# Patient Record
Sex: MALE | Race: WHITE | NOT HISPANIC OR LATINO | Employment: FULL TIME | URBAN - METROPOLITAN AREA
[De-identification: names, ages, dates, MRNs, and addresses within clinical notes are randomized per-mention and may not be internally consistent; named-entity substitution may affect disease eponyms.]

---

## 2023-04-07 ENCOUNTER — APPOINTMENT (OUTPATIENT)
Dept: CT IMAGING | Facility: CLINIC | Age: 43
End: 2023-04-07
Attending: FAMILY MEDICINE
Payer: COMMERCIAL

## 2023-04-07 ENCOUNTER — HOSPITAL ENCOUNTER (EMERGENCY)
Facility: CLINIC | Age: 43
Discharge: HOME | End: 2023-04-07
Attending: FAMILY MEDICINE | Admitting: FAMILY MEDICINE
Payer: COMMERCIAL

## 2023-04-07 VITALS
WEIGHT: 200 LBS | TEMPERATURE: 98.1 F | DIASTOLIC BLOOD PRESSURE: 82 MMHG | HEART RATE: 61 BPM | SYSTOLIC BLOOD PRESSURE: 127 MMHG | HEIGHT: 71 IN | OXYGEN SATURATION: 98 % | BODY MASS INDEX: 28 KG/M2

## 2023-04-07 DIAGNOSIS — J32.9 OTHER SINUSITIS, UNSPECIFIED CHRONICITY: ICD-10-CM

## 2023-04-07 DIAGNOSIS — R51.9 ACUTE NONINTRACTABLE HEADACHE, UNSPECIFIED HEADACHE TYPE: ICD-10-CM

## 2023-04-07 PROBLEM — F32.A ANXIETY AND DEPRESSION: Status: ACTIVE | Noted: 2023-04-07

## 2023-04-07 PROBLEM — E66.3 OVERWEIGHT: Status: ACTIVE | Noted: 2021-09-20

## 2023-04-07 PROBLEM — F90.2 ATTENTION DEFICIT HYPERACTIVITY DISORDER (ADHD), COMBINED TYPE: Status: ACTIVE | Noted: 2022-01-07

## 2023-04-07 PROBLEM — R41.840 CONCENTRATION DEFICIT: Status: ACTIVE | Noted: 2021-09-20

## 2023-04-07 PROBLEM — J33.9 NASAL POLYPOSIS: Status: ACTIVE | Noted: 2018-05-16

## 2023-04-07 PROBLEM — F41.9 ANXIETY AND DEPRESSION: Status: ACTIVE | Noted: 2023-04-07

## 2023-04-07 PROBLEM — S22.42XA MULTIPLE CLOSED FRACTURES OF RIBS OF LEFT SIDE: Status: ACTIVE | Noted: 2019-01-25

## 2023-04-07 LAB
ANION GAP SERPL CALCULATED.3IONS-SCNC: 9 MMOL/L (ref 7–15)
BASOPHILS # BLD AUTO: 0 10E3/UL (ref 0–0.2)
BASOPHILS NFR BLD AUTO: 1 %
BUN SERPL-MCNC: 11.8 MG/DL (ref 6–20)
CALCIUM SERPL-MCNC: 9.7 MG/DL (ref 8.6–10)
CHLORIDE SERPL-SCNC: 101 MMOL/L (ref 98–107)
COHGB MFR BLD: 1.1 % (ref 0–2)
CREAT SERPL-MCNC: 0.82 MG/DL (ref 0.67–1.17)
CRP SERPL-MCNC: 4.37 MG/L
DEPRECATED HCO3 PLAS-SCNC: 24 MMOL/L (ref 22–29)
EOSINOPHIL # BLD AUTO: 0.8 10E3/UL (ref 0–0.7)
EOSINOPHIL NFR BLD AUTO: 10 %
ERYTHROCYTE [DISTWIDTH] IN BLOOD BY AUTOMATED COUNT: 12.9 % (ref 10–15)
GFR SERPL CREATININE-BSD FRML MDRD: >90 ML/MIN/1.73M2
GLUCOSE SERPL-MCNC: 113 MG/DL (ref 70–99)
HCT VFR BLD AUTO: 35.2 % (ref 40–53)
HGB BLD-MCNC: 12.1 G/DL (ref 13.3–17.7)
IMM GRANULOCYTES # BLD: 0 10E3/UL
IMM GRANULOCYTES NFR BLD: 0 %
LYMPHOCYTES # BLD AUTO: 1 10E3/UL (ref 0.8–5.3)
LYMPHOCYTES NFR BLD AUTO: 14 %
MCH RBC QN AUTO: 31.5 PG (ref 26.5–33)
MCHC RBC AUTO-ENTMCNC: 34.4 G/DL (ref 31.5–36.5)
MCV RBC AUTO: 92 FL (ref 78–100)
MONOCYTES # BLD AUTO: 0.5 10E3/UL (ref 0–1.3)
MONOCYTES NFR BLD AUTO: 7 %
NEUTROPHILS # BLD AUTO: 4.9 10E3/UL (ref 1.6–8.3)
NEUTROPHILS NFR BLD AUTO: 68 %
NRBC # BLD AUTO: 0 10E3/UL
NRBC BLD AUTO-RTO: 0 /100
PLATELET # BLD AUTO: 238 10E3/UL (ref 150–450)
POTASSIUM SERPL-SCNC: 4.8 MMOL/L (ref 3.4–5.3)
RBC # BLD AUTO: 3.84 10E6/UL (ref 4.4–5.9)
SODIUM SERPL-SCNC: 134 MMOL/L (ref 136–145)
WBC # BLD AUTO: 7.3 10E3/UL (ref 4–11)

## 2023-04-07 PROCEDURE — 86140 C-REACTIVE PROTEIN: CPT | Performed by: FAMILY MEDICINE

## 2023-04-07 PROCEDURE — 96361 HYDRATE IV INFUSION ADD-ON: CPT | Performed by: FAMILY MEDICINE

## 2023-04-07 PROCEDURE — 96375 TX/PRO/DX INJ NEW DRUG ADDON: CPT | Performed by: FAMILY MEDICINE

## 2023-04-07 PROCEDURE — 85004 AUTOMATED DIFF WBC COUNT: CPT | Performed by: FAMILY MEDICINE

## 2023-04-07 PROCEDURE — 96374 THER/PROPH/DIAG INJ IV PUSH: CPT | Performed by: FAMILY MEDICINE

## 2023-04-07 PROCEDURE — 250N000011 HC RX IP 250 OP 636: Performed by: FAMILY MEDICINE

## 2023-04-07 PROCEDURE — 70450 CT HEAD/BRAIN W/O DYE: CPT

## 2023-04-07 PROCEDURE — 258N000003 HC RX IP 258 OP 636: Performed by: FAMILY MEDICINE

## 2023-04-07 PROCEDURE — 99284 EMERGENCY DEPT VISIT MOD MDM: CPT | Performed by: FAMILY MEDICINE

## 2023-04-07 PROCEDURE — 36415 COLL VENOUS BLD VENIPUNCTURE: CPT | Performed by: FAMILY MEDICINE

## 2023-04-07 PROCEDURE — 82375 ASSAY CARBOXYHB QUANT: CPT | Performed by: FAMILY MEDICINE

## 2023-04-07 PROCEDURE — 99285 EMERGENCY DEPT VISIT HI MDM: CPT | Mod: 25 | Performed by: FAMILY MEDICINE

## 2023-04-07 PROCEDURE — 80048 BASIC METABOLIC PNL TOTAL CA: CPT | Performed by: FAMILY MEDICINE

## 2023-04-07 RX ORDER — DEXTROAMPHETAMINE SACCHARATE, AMPHETAMINE ASPARTATE MONOHYDRATE, DEXTROAMPHETAMINE SULFATE AND AMPHETAMINE SULFATE 5; 5; 5; 5 MG/1; MG/1; MG/1; MG/1
1 CAPSULE, EXTENDED RELEASE ORAL EVERY MORNING
COMMUNITY
Start: 2023-04-06 | End: 2023-05-06

## 2023-04-07 RX ORDER — HYDROXYZINE HYDROCHLORIDE 25 MG/1
TABLET, FILM COATED ORAL
COMMUNITY
Start: 2023-02-16

## 2023-04-07 RX ORDER — KETOROLAC TROMETHAMINE 15 MG/ML
15 INJECTION, SOLUTION INTRAMUSCULAR; INTRAVENOUS ONCE
Status: COMPLETED | OUTPATIENT
Start: 2023-04-07 | End: 2023-04-07

## 2023-04-07 RX ORDER — SERTRALINE HYDROCHLORIDE 100 MG/1
1.5 TABLET, FILM COATED ORAL
COMMUNITY
Start: 2022-10-13

## 2023-04-07 RX ORDER — SODIUM CHLORIDE 9 MG/ML
INJECTION, SOLUTION INTRAVENOUS CONTINUOUS
Status: DISCONTINUED | OUTPATIENT
Start: 2023-04-07 | End: 2023-04-07 | Stop reason: HOSPADM

## 2023-04-07 RX ORDER — ONDANSETRON 2 MG/ML
4 INJECTION INTRAMUSCULAR; INTRAVENOUS ONCE
Status: COMPLETED | OUTPATIENT
Start: 2023-04-07 | End: 2023-04-07

## 2023-04-07 RX ORDER — DEXTROAMPHETAMINE SACCHARATE, AMPHETAMINE ASPARTATE, DEXTROAMPHETAMINE SULFATE AND AMPHETAMINE SULFATE 7.5; 7.5; 7.5; 7.5 MG/1; MG/1; MG/1; MG/1
30 TABLET ORAL DAILY
COMMUNITY
Start: 2023-03-24

## 2023-04-07 RX ORDER — FLUTICASONE PROPIONATE 50 MCG
2 SPRAY, SUSPENSION (ML) NASAL DAILY
Qty: 11.1 ML | Refills: 0 | Status: SHIPPED | OUTPATIENT
Start: 2023-04-07 | End: 2023-04-21

## 2023-04-07 RX ORDER — FLUTICASONE PROPIONATE AND SALMETEROL XINAFOATE 115; 21 UG/1; UG/1
AEROSOL, METERED RESPIRATORY (INHALATION)
COMMUNITY
Start: 2023-02-08

## 2023-04-07 RX ORDER — CIPROFLOXACIN 500 MG/1
500 TABLET, FILM COATED ORAL 2 TIMES DAILY
Qty: 28 TABLET | Refills: 0 | Status: SHIPPED | OUTPATIENT
Start: 2023-04-07 | End: 2023-04-21

## 2023-04-07 RX ORDER — MONTELUKAST SODIUM 10 MG/1
1 TABLET ORAL AT BEDTIME
COMMUNITY
Start: 2023-01-18

## 2023-04-07 RX ORDER — ONDANSETRON 4 MG/1
4 TABLET, ORALLY DISINTEGRATING ORAL EVERY 8 HOURS PRN
Qty: 10 TABLET | Refills: 0 | Status: SHIPPED | OUTPATIENT
Start: 2023-04-07 | End: 2023-04-10

## 2023-04-07 RX ADMIN — ONDANSETRON 4 MG: 2 INJECTION INTRAMUSCULAR; INTRAVENOUS at 10:01

## 2023-04-07 RX ADMIN — KETOROLAC TROMETHAMINE 15 MG: 15 INJECTION, SOLUTION INTRAMUSCULAR; INTRAVENOUS at 10:01

## 2023-04-07 RX ADMIN — SODIUM CHLORIDE: 9 INJECTION, SOLUTION INTRAVENOUS at 10:00

## 2023-04-07 ASSESSMENT — ACTIVITIES OF DAILY LIVING (ADL)
ADLS_ACUITY_SCORE: 35
ADLS_ACUITY_SCORE: 35

## 2023-04-07 NOTE — ED TRIAGE NOTES
headche for 3 days.  Pain is dull/achy.  Pain started in temples. No trauma.     Triage Assessment     Row Name 04/07/23 0914       Triage Assessment (Adult)    Airway WDL WDL       Respiratory WDL    Respiratory WDL WDL       Skin Circulation/Temperature WDL    Skin Circulation/Temperature WDL WDL       Cardiac WDL    Cardiac WDL WDL       Peripheral/Neurovascular WDL    Peripheral Neurovascular WDL WDL       Cognitive/Neuro/Behavioral WDL    Cognitive/Neuro/Behavioral WDL WDL

## 2023-04-07 NOTE — ED PROVIDER NOTES
History     Chief Complaint   Patient presents with     Headache     HPI  Rio Henry is a 43 year old male, past medical history is significant for anxiety and depression, ADHD, concentration deficit, obesity, intermittent asthma, nicotine dependence, presents to the emergency department with concerns of 3 days of headache.  History is obtained from the patient who was dropped off by his wife.  He states he began with gradual onset all over achy headache on Wednesday 2 days prior to presentation, the headache then shifted to the left retro-orbital area.  Light sensitivity primarily on the left eye.  Mild nausea, anorexia but no vomiting.  He also notes some numbness diffusely through his right leg correlating with the onset of symptoms 2 days ago with headache.  He notes no recent URI type symptoms other than runny nose which he has more or less chronically, he occasionally gets sinus infections but this does not feel like that.  No infectious exposure that he is aware of.  He is not the type of person to get headaches and after 2 days and unsuccessful treatment with Tylenol and ibuprofen he wanted to be evaluated.  There is a history for migraine and his father.  Patient can recall no recent trauma or injury of any kind.    Allergies:  Allergies   Allergen Reactions     Amoxicillin        Problem List:    Patient Active Problem List    Diagnosis Date Noted     Anxiety and depression 04/07/2023     Priority: Medium     Attention deficit hyperactivity disorder (ADHD), combined type 01/07/2022     Priority: Medium     Concentration deficit 09/20/2021     Priority: Medium     Overweight 09/20/2021     Priority: Medium     Multiple closed fractures of ribs of left side 01/25/2019     Priority: Medium     Formatting of this note might be different from the original.  Added automatically from request for surgery [id number]       Nasal polyposis 05/16/2018     Priority: Medium     Formatting of this note might be  "different from the original.  Added automatically from request for surgery [id number]       Intermittent asthma 05/17/2012     Priority: Medium     Nicotine dependence 05/17/2012     Priority: Medium        Past Medical History:    No past medical history on file.    Past Surgical History:    No past surgical history on file.    Family History:    No family history on file.    Social History:  Marital Status:   [2]        Medications:    amphetamine-dextroamphetamine (ADDERALL XR) 20 MG 24 hr capsule  amphetamine-dextroamphetamine (ADDERALL) 30 MG tablet  ciprofloxacin (CIPRO) 500 MG tablet  fluticasone (FLONASE) 50 MCG/ACT nasal spray  ondansetron (ZOFRAN ODT) 4 MG ODT tab  ADVAIR -21 MCG/ACT inhaler  FLUoxetine (PROZAC) 20 MG capsule  hydrOXYzine (ATARAX) 25 MG tablet  montelukast (SINGULAIR) 10 MG tablet  omeprazole (PRILOSEC) 20 MG DR capsule  sertraline (ZOLOFT) 100 MG tablet          Review of Systems   All other systems reviewed and are negative.      Physical Exam   BP: (!) 161/96  Pulse: 58  Temp: 98.1  F (36.7  C)  Height: 180.3 cm (5' 11\")  Weight: 90.7 kg (200 lb)  SpO2: 98 %      Physical Exam  Vitals and nursing note reviewed.   Constitutional:       General: He is not in acute distress.     Appearance: Normal appearance. He is normal weight. He is not ill-appearing.   HENT:      Head: Normocephalic and atraumatic.      Right Ear: Tympanic membrane, ear canal and external ear normal.      Left Ear: Tympanic membrane, ear canal and external ear normal.      Nose: Nose normal.      Mouth/Throat:      Mouth: Mucous membranes are dry.      Pharynx: Oropharynx is clear.   Eyes:      Extraocular Movements: Extraocular movements intact.      Conjunctiva/sclera: Conjunctivae normal.      Pupils: Pupils are equal, round, and reactive to light.   Cardiovascular:      Rate and Rhythm: Normal rate and regular rhythm.      Pulses: Normal pulses.      Heart sounds: Normal heart sounds.   Pulmonary:      " Effort: Pulmonary effort is normal.      Breath sounds: Normal breath sounds.   Abdominal:      General: Bowel sounds are normal.      Palpations: Abdomen is soft.   Musculoskeletal:         General: Normal range of motion.      Cervical back: Normal range of motion and neck supple.   Skin:     General: Skin is warm and dry.      Capillary Refill: Capillary refill takes less than 2 seconds.   Neurological:      General: No focal deficit present.      Mental Status: He is alert and oriented to person, place, and time.   Psychiatric:         Mood and Affect: Mood normal.         Behavior: Behavior normal.         ED Course                 Procedures           Results for orders placed or performed during the hospital encounter of 04/07/23 (from the past 24 hour(s))   CRP inflammation   Result Value Ref Range    CRP Inflammation 4.37 <5.00 mg/L   Carbon monoxide   Result Value Ref Range    Carbon Monoxide 1.1 0.0 - 2.0 %   Basic metabolic panel   Result Value Ref Range    Sodium 134 (L) 136 - 145 mmol/L    Potassium 4.8 3.4 - 5.3 mmol/L    Chloride 101 98 - 107 mmol/L    Carbon Dioxide (CO2) 24 22 - 29 mmol/L    Anion Gap 9 7 - 15 mmol/L    Urea Nitrogen 11.8 6.0 - 20.0 mg/dL    Creatinine 0.82 0.67 - 1.17 mg/dL    Calcium 9.7 8.6 - 10.0 mg/dL    Glucose 113 (H) 70 - 99 mg/dL    GFR Estimate >90 >60 mL/min/1.73m2   CBC with platelets differential    Narrative    The following orders were created for panel order CBC with platelets differential.  Procedure                               Abnormality         Status                     ---------                               -----------         ------                     CBC with platelets and d...[[id number]]  Abnormal            Final result                 Please view results for these tests on the individual orders.   CBC with platelets and differential   Result Value Ref Range    WBC Count 7.3 4.0 - 11.0 10e3/uL    RBC Count 3.84 (L) 4.40 - 5.90 10e6/uL    Hemoglobin  12.1 (L) 13.3 - 17.7 g/dL    Hematocrit 35.2 (L) 40.0 - 53.0 %    MCV 92 78 - 100 fL    MCH 31.5 26.5 - 33.0 pg    MCHC 34.4 31.5 - 36.5 g/dL    RDW 12.9 10.0 - 15.0 %    Platelet Count 238 150 - 450 10e3/uL    % Neutrophils 68 %    % Lymphocytes 14 %    % Monocytes 7 %    % Eosinophils 10 %    % Basophils 1 %    % Immature Granulocytes 0 %    NRBCs per 100 WBC 0 <1 /100    Absolute Neutrophils 4.9 1.6 - 8.3 10e3/uL    Absolute Lymphocytes 1.0 0.8 - 5.3 10e3/uL    Absolute Monocytes 0.5 0.0 - 1.3 10e3/uL    Absolute Eosinophils 0.8 (H) 0.0 - 0.7 10e3/uL    Absolute Basophils 0.0 0.0 - 0.2 10e3/uL    Absolute Immature Granulocytes 0.0 <=0.4 10e3/uL    Absolute NRBCs 0.0 10e3/uL   CT Head w/o Contrast    Narrative    CT SCAN OF THE HEAD WITHOUT CONTRAST   4/7/2023 10:53 AM     HISTORY: Left-sided headache, atypical, right leg numbness.    TECHNIQUE:  Axial images of the head and coronal reformations without  IV contrast material. Radiation dose for this scan was reduced using  automated exposure control, adjustment of the mA and/or kV according  to patient size, or iterative reconstruction technique.    COMPARISON: None.    FINDINGS: There is no evidence of intracranial hemorrhage, mass, acute  infarct or anomaly. The ventricles are normal in size, shape and  configuration. The brain parenchyma and subarachnoid spaces are  normal.     Marked mucosal thickening in the paranasal sinuses. The bony calvarium  and bones of the skull base appear intact.       Impression    IMPRESSION:     1. No evidence of acute intracranial hemorrhage, mass, or herniation.  2. Marked mucosal thickening in the paranasal sinuses.      EMILI QUINTERO MD         SYSTEM ID:  [id number]       Medications   sodium chloride 0.9% infusion (0 mLs Intravenous Stopped 4/7/23 1303)   ketorolac (TORADOL) injection 15 mg (15 mg Intravenous $Given 4/7/23 1001)   ondansetron (ZOFRAN) injection 4 mg (4 mg Intravenous $Given 4/7/23 1001)     1:43 PM  Headache  nearly completely resolved.  We reviewed lab diagnostics which are without any acute concerning findings.  His hemoglobin is down and I recommended he follow-up in clinic in a couple of weeks as he is never had this happen before.  Certainly not responsible for his symptom presentation here today, repeat possible iron studies.  CT reassuring with respect to lack of a serious underlying etiology for the patient's atypical headache here today.  We discussed the significant paranasal sinus mucosal thickening.  After discussion with the patient we will prescribe 2 weeks of oral antibiotic as well as Flonase and plan for a recheck in clinic in a couple of weeks, could do the blood count at the same time.  All questions were answered disposition is to home.  Assessments & Plan (with Medical Decision Making)     I have reviewed the nursing notes.    I have reviewed the findings, diagnosis, plan and need for follow up with the patient.        New Prescriptions    CIPROFLOXACIN (CIPRO) 500 MG TABLET    Take 1 tablet (500 mg) by mouth 2 times daily for 14 days    FLUTICASONE (FLONASE) 50 MCG/ACT NASAL SPRAY    Spray 2 sprays into both nostrils daily for 14 days    ONDANSETRON (ZOFRAN ODT) 4 MG ODT TAB    Take 1 tablet (4 mg) by mouth every 8 hours as needed for nausea       Final diagnoses:   Acute nonintractable headache, unspecified headache type   Other sinusitis, unspecified chronicity       4/7/2023   [facility] EMERGENCY DEPT     Tremaine Fischer MD  04/07/23 [id number]

## 2023-04-07 NOTE — DISCHARGE INSTRUCTIONS
Ibuprofen 400 mg p.o. 3 times daily with food for headache.  Zofran as needed for nausea/vomiting.  Cipro 500 mg p.o. twice daily x14 days.  Flonase as directed.  Return to the emergency department if worse or changes.